# Patient Record
Sex: FEMALE | Race: WHITE | NOT HISPANIC OR LATINO
[De-identification: names, ages, dates, MRNs, and addresses within clinical notes are randomized per-mention and may not be internally consistent; named-entity substitution may affect disease eponyms.]

---
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
## 2023-02-28 NOTE — ED ADULT NURSE NOTE - PRIMARY CARE PROVIDER
"Chief Complaints and History of Present Illnesses   Patient presents with     Consult For     Right eye pain     Chief Complaint(s) and History of Present Illness(es)     Consult For            Laterality: right eye    Associated symptoms: eye pain (aching pain, present for months, occ sharp pain as well.)    Pain scale: 6/10    Comments: Right eye pain          Comments    Jaymie Xiao is being seen for a consult today by the request of Dr. Denton for encleation/evisceration evaluation.    Pt cannot see out of the OD, she has been having pain for months and feels, \"It will not go away\"    Yumiko Tamez COT February 28, 2023 10:39 AM                         "
Dr. Dl Topete

## 2023-05-19 ENCOUNTER — APPOINTMENT (OUTPATIENT)
Dept: OTOLARYNGOLOGY | Facility: CLINIC | Age: 23
End: 2023-05-19
Payer: COMMERCIAL

## 2023-05-19 VITALS
DIASTOLIC BLOOD PRESSURE: 78 MMHG | WEIGHT: 127 LBS | HEART RATE: 80 BPM | BODY MASS INDEX: 21.68 KG/M2 | SYSTOLIC BLOOD PRESSURE: 138 MMHG | HEIGHT: 64 IN

## 2023-05-19 DIAGNOSIS — Z82.49 FAMILY HISTORY OF ISCHEMIC HEART DISEASE AND OTHER DISEASES OF THE CIRCULATORY SYSTEM: ICD-10-CM

## 2023-05-19 DIAGNOSIS — J03.00 ACUTE STREPTOCOCCAL TONSILLITIS, UNSPECIFIED: ICD-10-CM

## 2023-05-19 DIAGNOSIS — Z87.19 PERSONAL HISTORY OF OTHER DISEASES OF THE DIGESTIVE SYSTEM: ICD-10-CM

## 2023-05-19 DIAGNOSIS — Z78.9 OTHER SPECIFIED HEALTH STATUS: ICD-10-CM

## 2023-05-19 DIAGNOSIS — J35.01 CHRONIC TONSILLITIS: ICD-10-CM

## 2023-05-19 DIAGNOSIS — Z87.09 PERSONAL HISTORY OF OTHER DISEASES OF THE RESPIRATORY SYSTEM: ICD-10-CM

## 2023-05-19 PROCEDURE — 99204 OFFICE O/P NEW MOD 45 MIN: CPT

## 2023-05-19 RX ORDER — POLYETHYLENE GLYCOL 3350 17 G/17G
17 POWDER, FOR SOLUTION ORAL DAILY
Qty: 510 | Refills: 5 | Status: COMPLETED | COMMUNITY
Start: 2017-10-12 | End: 2023-05-19

## 2023-05-19 RX ORDER — CLINDAMYCIN HYDROCHLORIDE 150 MG/1
150 CAPSULE ORAL 3 TIMES DAILY
Qty: 30 | Refills: 0 | Status: ACTIVE | COMMUNITY
Start: 2023-05-19 | End: 1900-01-01

## 2023-05-19 RX ORDER — IRON/IRON ASP GLY/FA/MV-MIN 38 125-25-1MG
TABLET ORAL
Refills: 0 | Status: ACTIVE | COMMUNITY

## 2023-05-19 RX ORDER — NORTRIPTYLINE HCL 25 MG
CAPSULE ORAL
Refills: 0 | Status: ACTIVE | COMMUNITY

## 2023-05-19 NOTE — PHYSICAL EXAM
[Midline] : trachea located in midline position [Normal] : no rashes [de-identified] : 2+ an cryptic without infection/inflammation

## 2023-05-19 NOTE — REASON FOR VISIT
[Initial Consultation] : an initial consultation for [Family Member] : family member [FreeTextEntry2] : enlarged tonsils

## 2023-05-19 NOTE — CONSULT LETTER
[Consult Letter:] : I had the pleasure of evaluating your patient, [unfilled]. [Please see my note below.] : Please see my note below. [Consult Closing:] : Thank you very much for allowing me to participate in the care of this patient.  If you have any questions, please do not hesitate to contact me. [Sincerely,] : Sincerely, [Dear  ___] : Dear  [unfilled], [DrJonathan  ___] : Dr. PAT [FreeTextEntry2] : Kingston Calvillo MD [FreeTextEntry3] : Marty Cooper MD, FACS\par Chief of Otolaryngology and Head & Neck Surgery\par [facility]\par  - Dept. of Otolaryngology\par [facility]

## 2023-05-19 NOTE — ASSESSMENT
[FreeTextEntry1] : Pt's may require a tonsillectomy, but she has not had a trial of Clindamycin or Augmentin.  Will try Clinda if the infection recurs again.\par \par Risks of Clindamycin were discussed with pt, including the potential for C. difficile colitis.  Pt advised to take a probiotic with the medication and was also advised to D/C the medication if she developed any diarrhea.\par \par If Clindamycin is not effective we will schedule a tonsillectomy.  If her infections do not recur we will defer surgical intervention.\par \par Risks of tonsillectomy d/w patient and her mother, including, but not limited to, severe bleeding and possible need for additional surgery.\par

## 2023-05-19 NOTE — REVIEW OF SYSTEMS
[As Noted in HPI] : as noted in HPI [Negative] : Endocrine [de-identified] : h/o heavy menses in the past, now controlled with BCP

## 2023-05-19 NOTE — HISTORY OF PRESENT ILLNESS
[de-identified] : 22 year old female referred by Dr Calvillo for enlarged tonsils. History of recurrent strep infections when she was a child. States since January has had persistent tonsillitis causing throat pain.  She has had multiple courses of Amoxicillin and steroids which helps, but the symptoms come right back. Last tonsillitis episode was 3 weeks ago. States when she has tonsillitis, tonsils become so enlarged they almost touch each other, white spots on her tonsils, odynophagia,chills, and fevers. Denies fevers, dysphagia, odynophagia, dyspnea, dysphonia or otalgia. \par \par